# Patient Record
Sex: FEMALE | Race: BLACK OR AFRICAN AMERICAN
[De-identification: names, ages, dates, MRNs, and addresses within clinical notes are randomized per-mention and may not be internally consistent; named-entity substitution may affect disease eponyms.]

---

## 2017-01-11 ENCOUNTER — HOSPITAL ENCOUNTER (EMERGENCY)
Dept: HOSPITAL 61 - ER | Age: 37
LOS: 1 days | Discharge: HOME | End: 2017-01-12
Payer: COMMERCIAL

## 2017-01-11 VITALS — HEIGHT: 65 IN | BODY MASS INDEX: 39.65 KG/M2 | WEIGHT: 238 LBS

## 2017-01-11 DIAGNOSIS — R10.13: ICD-10-CM

## 2017-01-11 DIAGNOSIS — R07.89: Primary | ICD-10-CM

## 2017-01-11 DIAGNOSIS — M54.6: ICD-10-CM

## 2017-01-11 DIAGNOSIS — R42: ICD-10-CM

## 2017-01-11 LAB
ALBUMIN SERPL-MCNC: 3.4 G/DL (ref 3.4–5)
ALP SERPL-CCNC: 120 U/L (ref 46–116)
ALT SERPL-CCNC: 26 U/L (ref 14–59)
ANION GAP SERPL CALC-SCNC: 9 MMOL/L (ref 6–14)
AST SERPL-CCNC: 20 U/L (ref 15–37)
BASOPHILS # BLD AUTO: 0.1 X10^3/UL (ref 0–0.2)
BASOPHILS NFR BLD: 1 % (ref 0–3)
BILIRUB DIRECT SERPL-MCNC: 0.1 MG/DL (ref 0–0.2)
BILIRUB SERPL-MCNC: 0.2 MG/DL (ref 0.2–1)
BUN SERPL-MCNC: 8 MG/DL (ref 7–20)
CALCIUM SERPL-MCNC: 9.1 MG/DL (ref 8.5–10.1)
CHLORIDE SERPL-SCNC: 102 MMOL/L (ref 98–107)
CO2 SERPL-SCNC: 26 MMOL/L (ref 21–32)
CREAT SERPL-MCNC: 0.7 MG/DL (ref 0.6–1)
EOSINOPHIL NFR BLD: 2 % (ref 0–3)
ERYTHROCYTE [DISTWIDTH] IN BLOOD BY AUTOMATED COUNT: 17.4 % (ref 11.5–14.5)
GFR SERPLBLD BASED ON 1.73 SQ M-ARVRAT: 115.2 ML/MIN
GLUCOSE SERPL-MCNC: 123 MG/DL (ref 70–99)
HCT VFR BLD CALC: 33.5 % (ref 36–47)
HGB BLD-MCNC: 10.2 G/DL (ref 12–15.5)
LYMPHOCYTES # BLD: 2.4 X10^3/UL (ref 1–4.8)
LYMPHOCYTES NFR BLD AUTO: 21 % (ref 24–48)
MCH RBC QN AUTO: 23 PG (ref 25–35)
MCHC RBC AUTO-ENTMCNC: 31 G/DL (ref 31–37)
MCV RBC AUTO: 76 FL (ref 79–100)
MONOCYTES NFR BLD: 8 % (ref 0–9)
NEG OBC UR: (no result)
NEUTROPHILS NFR BLD AUTO: 70 % (ref 31–73)
PLATELET # BLD AUTO: 467 X10^3/UL (ref 140–400)
POS OBC UR: (no result)
POTASSIUM SERPL-SCNC: 4.5 MMOL/L (ref 3.5–5.1)
PROT SERPL-MCNC: 8.1 G/DL (ref 6.4–8.2)
RBC # BLD AUTO: 4.41 X10^6/UL (ref 3.5–5.4)
SODIUM SERPL-SCNC: 137 MMOL/L (ref 136–145)
WBC # BLD AUTO: 11.8 X10^3/UL (ref 4–11)

## 2017-01-11 PROCEDURE — 96374 THER/PROPH/DIAG INJ IV PUSH: CPT

## 2017-01-11 PROCEDURE — 84484 ASSAY OF TROPONIN QUANT: CPT

## 2017-01-11 PROCEDURE — 36415 COLL VENOUS BLD VENIPUNCTURE: CPT

## 2017-01-11 PROCEDURE — 99285 EMERGENCY DEPT VISIT HI MDM: CPT

## 2017-01-11 PROCEDURE — 81025 URINE PREGNANCY TEST: CPT

## 2017-01-11 PROCEDURE — 85027 COMPLETE CBC AUTOMATED: CPT

## 2017-01-11 PROCEDURE — 96361 HYDRATE IV INFUSION ADD-ON: CPT

## 2017-01-11 PROCEDURE — 80048 BASIC METABOLIC PNL TOTAL CA: CPT

## 2017-01-11 PROCEDURE — 71020: CPT

## 2017-01-11 PROCEDURE — 93005 ELECTROCARDIOGRAM TRACING: CPT

## 2017-01-11 PROCEDURE — S0028 INJECTION, FAMOTIDINE, 20 MG: HCPCS

## 2017-01-11 PROCEDURE — 80076 HEPATIC FUNCTION PANEL: CPT

## 2017-01-11 PROCEDURE — 83690 ASSAY OF LIPASE: CPT

## 2017-01-11 NOTE — PHYS DOC
Past Medical History


Past Medical History:  No Pertinent History


Past Surgical History:  No Surgical History


Alcohol Use:  None


Drug Use:  None





Adult General


Chief Complaint


Chief Complaint:  CHEST PAIN





HPI


HPI


Patient is a 36  year old female who presents with chest pain. Patient reports 

since earlier this evening she has had sternal pressure as well as back pain 

and epigastric pain. She also reports a little bit dizziness. No shortness of 

breath. No clear inciting or mitigating factors. She has not taken anything for 

symptoms.





Review of Systems


Review of Systems


Constitutional: Denies fever or chills 


Eyes: Denies change in visual acuity or eye pain 


HENT: Denies nasal congestion or sore throat 


Respiratory: Denies cough or shortness of breath 


Cardiovascular: Chest pain


GI: Epigastric abdominal pain. Denies nausea, vomiting, bloody stools or 

diarrhea 


: Denies dysuria or hematuria 


Musculoskeletal: Upper back pain


Integument: Denies rash or skin lesions 


Neurologic: Denies headache, focal weakness or sensory changes





Current Medications


Current Medications





 Current Medications








 Medications


  (Trade)  Dose


 Ordered  Sig/Hesham  Start Time


 Stop Time Status Last Admin


Dose Admin


 


 Acetaminophen/


 Hydrocodone Bitart


  (Lortab 5/325)  2 tab  1X  ONCE  1/11/17 23:15


 1/11/17 23:16 DC 1/11/17 23:15


2 TAB


 


 Famotidine


  (Pepcid)  20 mg  1X  ONCE  1/11/17 23:15


 1/11/17 23:16 DC 1/11/17 23:14


20 MG


 


 Sodium Chloride


  (Iv Sodium


 Chloride 0.9%


 1000ml Bag)  1,000 ml @ 


 1,000 mls/hr  Q1H  1/11/17 23:00


 1/11/17 23:59 DC 1/11/17 23:14


1,000 MLS/HR











Allergies


Allergies





 Allergies








Coded Allergies Type Severity Reaction Last Updated Verified


 


  No Known Drug Allergies    7/8/15 No











Physical Exam


Physical Exam


Constitutional: Well developed, well nourished, no acute distress, non-toxic 

appearance 


HENT: Normocephalic, atraumatic, bilateral external ears normal


Eyes: EOMI, conjunctiva normal, no discharge


Neck: Normal range of motion, no stridor


Cardiovascular: Heart rate normal, regular rhythm, no murmur 


Lungs & Thorax:  Bilateral breath sounds clear to auscultation


Abdomen: Bowel sounds normal, soft, non-distended, epigastric TTP without 

guarding or rebound


Skin: Warm, dry, no erythema, no rash


Extremities: No obvious deformity, no edema


Neurologic: Alert and oriented X 3, no gross deficits noted





Current Patient Data


Vital Signs





 Vital Signs








  Date Time  Temp Pulse Resp B/P Pulse Ox O2 Delivery O2 Flow Rate FiO2


 


1/12/17 00:00  94 22 116/69 98 Room Air  








Lab Values





 Laboratory Tests








Test


  1/11/17


21:50 1/11/17


23:15


 


White Blood Count


  11.8x10^3/uL


(4.0-11.0)  H 


 


 


Red Blood Count


  4.41x10^6/uL


(3.50-5.40) 


 


 


Hemoglobin


  10.2g/dL


(12.0-15.5)  L 


 


 


Hematocrit


  33.5%


(36.0-47.0)  L 


 


 


Mean Corpuscular Volume


  76fL ()


L 


 


 


Mean Corpuscular Hemoglobin 23pg (25-35)  L 


 


Mean Corpuscular Hemoglobin


Concent 31g/dL (31-37)


  


 


 


Red Cell Distribution Width


  17.4%


(11.5-14.5)  H 


 


 


Platelet Count


  467x10^3/uL


(140-400)  H 


 


 


Neutrophils (%) (Auto) 70% (31-73)   


 


Lymphocytes (%) (Auto) 21% (24-48)  L 


 


Monocytes (%) (Auto) 8% (0-9)   


 


Eosinophils (%) (Auto) 2% (0-3)   


 


Basophils (%) (Auto) 1% (0-3)   


 


Neutrophils # (Auto)


  8.2x10^3uL


(1.8-7.7)  H 


 


 


Lymphocytes # (Auto)


  2.4x10^3/uL


(1.0-4.8) 


 


 


Monocytes # (Auto)


  0.9x10^3/uL


(0.0-1.1) 


 


 


Eosinophils # (Auto)


  0.2x10^3/uL


(0.0-0.7) 


 


 


Basophils # (Auto)


  0.1x10^3/uL


(0.0-0.2) 


 


 


Sodium Level


  137mmol/L


(136-145) 


 


 


Potassium Level


  4.5mmol/L


(3.5-5.1) 


 


 


Chloride Level


  102mmol/L


() 


 


 


Carbon Dioxide Level


  26mmol/L


(21-32) 


 


 


Anion Gap 9 (6-14)   


 


Blood Urea Nitrogen 8mg/dL (7-20)   


 


Creatinine


  0.7mg/dL


(0.6-1.0) 


 


 


Estimated GFR


(Cockcroft-Gault) 115.2  


  


 


 


Glucose Level


  123mg/dL


(70-99)  H 


 


 


Calcium Level


  9.1mg/dL


(8.5-10.1) 


 


 


Total Bilirubin


  0.2mg/dL


(0.2-1.0) 


 


 


Direct Bilirubin


  0.1mg/dL


(0.0-0.2) 


 


 


Aspartate Amino Transferase


(AST) 20U/L (15-37)  


  


 


 


Alanine Aminotransferase (ALT) 26U/L (14-59)   


 


Alkaline Phosphatase


  120U/L


()  H 


 


 


Troponin I Quantitative


  < 0.017ng/mL


(0.000-0.055) 


 


 


Total Protein


  8.1g/dL


(6.4-8.2) 


 


 


Albumin


  3.4g/dL


(3.4-5.0) 


 


 


Lipase


  152U/L


() 


 


 


Urine Pregnancy Test


  


  Negative (NEG)


 





 Laboratory Tests


1/11/17 21:50








 Laboratory Tests


1/11/17 21:50











EKG


EKG


EKG (my read): sinus tachycardia, rate 110, normal axis, intervals wnl, no 

acute ischemic changes





Radiology/Procedures


Radiology/Procedures


CXR (my read): 


No acute abnormality





Course & Med Decision Making


Course & Med Decision Making


Pertinent Labs and Imaging studies reviewed. (See chart for details)


Patient is 36-year-old female who presents with chest and upper back pain. 

Suspect GERD as etiology for symptoms. Will screen for more serious pathology 

with EKG, chest x-ray, labs. IV fluids, Pepcid, oral pain medication ordered 

for relief of symptoms. EKG and chest x-ray okay per my read. Labs notable for 

mild leukocytosis and anemia, both near baseline. Troponin within normal 

limits. Discussed results with patient. Discharged home with prescription for 

tramadol and Pepcid, instructions for follow-up, return precautions.





Dragon Disclaimer


Dragon Disclaimer


This electronic medical record was generated, in whole or in part, using a 

voice recognition dictation system.





Departure


Departure


Impression:  


 Primary Impression:  


 Atypical chest pain


Disposition:  01 HOME, SELF-CARE


Condition:  STABLE


Referrals:  


NO PCP (PCP)


Patient Instructions:  Chest Pain (Nonspecific), Gastroesophageal Reflux Disease

, Adult





Additional Instructions:


Thank you for allowing us to provide care today in the Emergency Department.


Take the provided medication as directed. Use caution when taking the pain 

medication as it can make you drowsy.


Schedule a follow up appointment with your primary care doctor. 


Return promptly to the Emergency Department if you develop any new or 

concerning symptoms.


Scripts


Famotidine (Pepcid)20 Mg [id number] Mg PO HS #30 TAB


   Prov:MAVIS LO MD         1/12/17


Tramadol Hcl 50 Mg Tablet1 Tab PO PRN Q6HRS PRN PAIN #15 TAB


   Prov:MAVIS LO MD         1/12/17








MAVIS LO MD Jan 11, 2017 22:59

## 2017-01-12 VITALS — DIASTOLIC BLOOD PRESSURE: 69 MMHG | SYSTOLIC BLOOD PRESSURE: 116 MMHG

## 2017-01-12 NOTE — EKG
[facility]

              [address] [place] [address]

Test Date:    2017               Test Time:    21:47:08

Pat Name:     SHAKILA NEGRETE          Department:   

Patient ID:   [id number]           Room:          

Gender:       F                        Technician:   

:          [id number]               Requested By: MAVIS LO

Order Number: [id number]            Reading MD:   Dominik Saunders

                                 Measurements

Intervals                              Axis          

Rate:         110                      P:            43

CT:           138                      QRS:          33

QRSD:         80                       T:            13

QT:           336                                    

QTc:          460                                    

                           Interpretive Statements

SINUS TACHYCARDIA



Electronically Signed On 2017 10:06:27 CST by Dominik Saunders

## 2017-01-12 NOTE — RAD
EXAM: Chest, 2 views.



HISTORY: Chest pain.



COMPARISON: None.



FINDINGS: Frontal and lateral views of the chest are obtained. There is no

infiltrate, effusion or pneumothorax. The heart is normal in size.



IMPRESSION: No acute pulmonary finding.

## 2018-10-21 ENCOUNTER — HOSPITAL ENCOUNTER (EMERGENCY)
Dept: HOSPITAL 61 - ER | Age: 38
Discharge: HOME | End: 2018-10-21
Payer: COMMERCIAL

## 2018-10-21 VITALS — DIASTOLIC BLOOD PRESSURE: 72 MMHG | SYSTOLIC BLOOD PRESSURE: 120 MMHG

## 2018-10-21 VITALS — WEIGHT: 244 LBS | BODY MASS INDEX: 40.65 KG/M2 | HEIGHT: 65 IN

## 2018-10-21 DIAGNOSIS — R20.2: Primary | ICD-10-CM

## 2018-10-21 LAB
ALBUMIN SERPL-MCNC: 3.3 G/DL (ref 3.4–5)
ALBUMIN/GLOB SERPL: 0.6 {RATIO} (ref 1–1.7)
ALP SERPL-CCNC: 111 U/L (ref 46–116)
ALT SERPL-CCNC: 19 U/L (ref 14–59)
AMPHETAMINE/METHAMPHETAMINE: (no result)
ANION GAP SERPL CALC-SCNC: 11 MMOL/L (ref 6–14)
APTT PPP: YELLOW S
AST SERPL-CCNC: 11 U/L (ref 15–37)
BACTERIA #/AREA URNS HPF: 0 /HPF
BARBITURATES UR-MCNC: (no result) UG/ML
BASOPHILS # BLD AUTO: 0.1 X10^3/UL (ref 0–0.2)
BASOPHILS NFR BLD: 1 % (ref 0–3)
BENZODIAZ UR-MCNC: (no result) UG/L
BILIRUB SERPL-MCNC: 0.4 MG/DL (ref 0.2–1)
BILIRUB UR QL STRIP: NEGATIVE
BUN SERPL-MCNC: 8 MG/DL (ref 7–20)
BUN/CREAT SERPL: 11 (ref 6–20)
CALCIUM SERPL-MCNC: 8.8 MG/DL (ref 8.5–10.1)
CANNABINOIDS UR-MCNC: (no result) UG/L
CHLORIDE SERPL-SCNC: 105 MMOL/L (ref 98–107)
CO2 SERPL-SCNC: 27 MMOL/L (ref 21–32)
COCAINE UR-MCNC: (no result) NG/ML
CREAT SERPL-MCNC: 0.7 MG/DL (ref 0.6–1)
EOSINOPHIL NFR BLD: 0.1 X10^3/UL (ref 0–0.7)
EOSINOPHIL NFR BLD: 1 % (ref 0–3)
ERYTHROCYTE [DISTWIDTH] IN BLOOD BY AUTOMATED COUNT: 16.4 % (ref 11.5–14.5)
FIBRINOGEN PPP-MCNC: CLEAR MG/DL
GFR SERPLBLD BASED ON 1.73 SQ M-ARVRAT: 113.9 ML/MIN
GLOBULIN SER-MCNC: 5.1 G/DL (ref 2.2–3.8)
GLUCOSE SERPL-MCNC: 115 MG/DL (ref 70–99)
HCT VFR BLD CALC: 35.2 % (ref 36–47)
HGB BLD-MCNC: 11.8 G/DL (ref 12–15.5)
LYMPHOCYTES # BLD: 2.6 X10^3/UL (ref 1–4.8)
LYMPHOCYTES NFR BLD AUTO: 26 % (ref 24–48)
MAGNESIUM SERPL-MCNC: 2 MG/DL (ref 1.8–2.4)
MCH RBC QN AUTO: 26 PG (ref 25–35)
MCHC RBC AUTO-ENTMCNC: 33 G/DL (ref 31–37)
MCV RBC AUTO: 76 FL (ref 79–100)
METHADONE SERPL-MCNC: (no result) NG/ML
MONO #: 0.6 X10^3/UL (ref 0–1.1)
MONOCYTES NFR BLD: 6 % (ref 0–9)
NEUT #: 6.6 X10^3UL (ref 1.8–7.7)
NEUTROPHILS NFR BLD AUTO: 66 % (ref 31–73)
NITRITE UR QL STRIP: NEGATIVE
OPIATES UR-MCNC: (no result) NG/ML
PCP SERPL-MCNC: (no result) MG/DL
PH UR STRIP: 5.5 [PH]
PLATELET # BLD AUTO: 425 X10^3/UL (ref 140–400)
POTASSIUM SERPL-SCNC: 3.9 MMOL/L (ref 3.5–5.1)
PREG TEST PT QUAL: NEGATIVE
PROT SERPL-MCNC: 8.4 G/DL (ref 6.4–8.2)
PROT UR STRIP-MCNC: NEGATIVE MG/DL
PROTHROMBIN TIME: 12.7 SEC (ref 11.7–14)
RBC # BLD AUTO: 4.61 X10^6/UL (ref 3.5–5.4)
RBC #/AREA URNS HPF: 0 /HPF (ref 0–2)
SODIUM SERPL-SCNC: 143 MMOL/L (ref 136–145)
SQUAMOUS #/AREA URNS LPF: (no result) /LPF
UROBILINOGEN UR-MCNC: 0.2 MG/DL
WBC # BLD AUTO: 10 X10^3/UL (ref 4–11)
WBC #/AREA URNS HPF: (no result) /HPF (ref 0–4)

## 2018-10-21 PROCEDURE — G0479 DRUG TEST PRESUMP NOT OPT: HCPCS

## 2018-10-21 PROCEDURE — 84443 ASSAY THYROID STIM HORMONE: CPT

## 2018-10-21 PROCEDURE — 80053 COMPREHEN METABOLIC PANEL: CPT

## 2018-10-21 PROCEDURE — 83605 ASSAY OF LACTIC ACID: CPT

## 2018-10-21 PROCEDURE — 84703 CHORIONIC GONADOTROPIN ASSAY: CPT

## 2018-10-21 PROCEDURE — 72148 MRI LUMBAR SPINE W/O DYE: CPT

## 2018-10-21 PROCEDURE — 36415 COLL VENOUS BLD VENIPUNCTURE: CPT

## 2018-10-21 PROCEDURE — 81001 URINALYSIS AUTO W/SCOPE: CPT

## 2018-10-21 PROCEDURE — 83735 ASSAY OF MAGNESIUM: CPT

## 2018-10-21 PROCEDURE — 81025 URINE PREGNANCY TEST: CPT

## 2018-10-21 PROCEDURE — 85610 PROTHROMBIN TIME: CPT

## 2018-10-21 PROCEDURE — 85025 COMPLETE CBC W/AUTO DIFF WBC: CPT

## 2018-10-21 PROCEDURE — 80307 DRUG TEST PRSMV CHEM ANLYZR: CPT

## 2018-10-21 NOTE — RAD
MRI lumbar spine without contrast 10/21/2018

 

CLINICAL INDICATION: Lower extremity numbness into the groin.

 

COMPARISON: None.

 

TECHNIQUE: Multisequence, multi planar MR imaging of the lumbar spine was 

obtained without contrast.

 

FINDINGS:

 

Examination assumes 5 nonrib-bearing lumbar-type vertebral bodies. 

Vertebral body heights are maintained. Mild disc degeneration at L5-S1 

with disc desiccation and disc space narrowing. The conus medullaris is 

normal in appearance and position terminating posterior to L1. The 

paraspinal soft tissues are unremarkable. No suspicious marrow signal 

abnormality.

 

At T12-L1 no significant spinal canal or neural foraminal narrowing.

 

At L1-L2, no significant spinal canal or neural foraminal narrowing.

 

At L2-L3, no significant spinal canal or neural foraminal narrowing.

 

At L3-L4, no significant spinal canal or neural foraminal narrowing.

 

At L4-L5, no significant spinal canal or neural foraminal narrowing.

 

At L5-S1, shallow concentric disc bulging with superimposed central/right 

central disc protrusion without significant spinal canal or neural 

foraminal narrowing.

 

IMPRESSION:

At L5-S1, disc degeneration with disc protrusion without significant 

spinal canal or neural foraminal narrowing.

 

Electronically signed by: Joss Tran MD (10/21/2018 4:49 PM) [facility]

## 2018-10-21 NOTE — PHYS DOC
Past Medical History


Past Medical History:  No Pertinent History


Past Surgical History:  No Surgical History


Alcohol Use:  None


Drug Use:  None





Adult General


Chief Complaint


Chief Complaint:  NEURO SYMPTOMS/DEFICITS





HPI


HPI





Patient is a 38  year old female] who presents with bilateral lower extremity 

numbness. Patient was last normal when she went to bed sometime between 

midnight and 2 AM today. Woke up at approximately noon with bilateral lower 

extremity numbness. This does not affect her walking. No weakness. Reports that 

this is from the waist inferiorly. There is been no trauma, no loss of bowel or 

bladder control. Denies any fever. Denies any prior history of this happening. 

Nothing seems to make this better or worse.





Review of Systems


Review of Systems





Constitutional: Denies fever or chills []


Eyes: Denies change in visual acuity, redness, or eye pain []


HENT: Denies nasal congestion or sore throat []


Respiratory: Denies cough or shortness of breath []


Cardiovascular: No chest pain or palpitations[]


GI: Denies abdominal pain, nausea, vomiting, bloody stools or diarrhea []


: Denies dysuria or hematuria []


Musculoskeletal: Denies back pain or joint pain []


Integument: Denies rash or skin lesions []


Neurologic: Denies headache or focal weakness []


Endocrine: Denies polyuria or polydipsia []





All other systems were reviewed and found to be within normal limits, except as 

documented in this note.





Allergies


Allergies





Allergies








Coded Allergies Type Severity Reaction Last Updated Verified


 


  No Known Drug Allergies    7/8/15 No











Physical Exam


Physical Exam





Constitutional: Well developed, well nourished, no acute distress, non-toxic 

appearance. []


HENT: Normocephalic, atraumatic, bilateral external ears normal, oropharynx 

moist, no oral exudates, nose normal. []


Eyes: PERRLA, EOMI, conjunctiva normal, no discharge. [] 


Neck: Normal range of motion, no tenderness, supple, no stridor. [] 


Cardiovascular:Heart rate regular rhythm, no murmur []


Lungs & Thorax:  Bilateral breath sounds clear to auscultation []


Abdomen: Bowel sounds normal, soft, no tenderness, no masses, no pulsatile 

masses. [] 


Skin: Warm, dry, no erythema, no rash. [] 


Back: No tenderness, no CVA tenderness. [] 


Extremities: No tenderness, no cyanosis, no clubbing, ROM intact, no edema. [] 


Neurologic: Alert and oriented X 3, normal motor function, normal sensory 

function, no focal deficits noted. DTRs are 2 over 4 and bilaterally symmetric 

in the patella and Achilles. Patient has normal proprioception of the lower 

extremities. Normal sharp versus dull. Normal gait.[]


Psychologic: Affect normal, judgement normal, mood normal. []





Current Patient Data


Vital Signs





 Vital Signs








  Date Time  Temp Pulse Resp B/P (MAP) Pulse Ox O2 Delivery O2 Flow Rate FiO2


 


10/21/18 14:25 98.2 100 12 149/90 (109) 98 Room Air  





 98.2       








Lab Values





 Laboratory Tests








Test


 10/21/18


15:20 10/21/18


15:35 10/21/18


16:00


 


Lactic Acid Level


 1.7 mmol/L


(0.4-2.0) 


 





 


White Blood Count


 


 10.0 x10^3/uL


(4.0-11.0) 





 


Red Blood Count


 


 4.61 x10^6/uL


(3.50-5.40) 





 


Hemoglobin


 


 11.8 g/dL


(12.0-15.5)  L 





 


Hematocrit


 


 35.2 %


(36.0-47.0)  L 





 


Mean Corpuscular Volume


 


 76 fL ()


L 





 


Mean Corpuscular Hemoglobin  26 pg (25-35)   


 


Mean Corpuscular Hemoglobin


Concent 


 33 g/dL


(31-37) 





 


Red Cell Distribution Width


 


 16.4 %


(11.5-14.5)  H 





 


Platelet Count


 


 425 x10^3/uL


(140-400)  H 





 


Neutrophils (%) (Auto)  66 % (31-73)   


 


Lymphocytes (%) (Auto)  26 % (24-48)   


 


Monocytes (%) (Auto)  6 % (0-9)   


 


Eosinophils (%) (Auto)  1 % (0-3)   


 


Basophils (%) (Auto)  1 % (0-3)   


 


Neutrophils # (Auto)


 


 6.6 x10^3uL


(1.8-7.7) 





 


Lymphocytes # (Auto)


 


 2.6 x10^3/uL


(1.0-4.8) 





 


Monocytes # (Auto)


 


 0.6 x10^3/uL


(0.0-1.1) 





 


Eosinophils # (Auto)


 


 0.1 x10^3/uL


(0.0-0.7) 





 


Basophils # (Auto)


 


 0.1 x10^3/uL


(0.0-0.2) 





 


Prothrombin Time


 


 12.7 SEC


(11.7-14.0) 





 


Prothrombin Time INR  1.0 (0.8-1.1)   


 


Sodium Level


 


 143 mmol/L


(136-145) 





 


Potassium Level


 


 3.9 mmol/L


(3.5-5.1) 





 


Chloride Level


 


 105 mmol/L


() 





 


Carbon Dioxide Level


 


 27 mmol/L


(21-32) 





 


Anion Gap  11 (6-14)   


 


Blood Urea Nitrogen


 


 8 mg/dL (7-20)


 





 


Creatinine


 


 0.7 mg/dL


(0.6-1.0) 





 


Estimated GFR


(Cockcroft-Gault) 


 113.9  


 





 


BUN/Creatinine Ratio  11 (6-20)   


 


Glucose Level


 


 115 mg/dL


(70-99)  H 





 


Calcium Level


 


 8.8 mg/dL


(8.5-10.1) 





 


Magnesium Level


 


 2.0 mg/dL


(1.8-2.4) 





 


Total Bilirubin


 


 0.4 mg/dL


(0.2-1.0) 





 


Aspartate Amino Transferase


(AST) 


 11 U/L (15-37)


L 





 


Alanine Aminotransferase (ALT)


 


 19 U/L (14-59)


 





 


Alkaline Phosphatase


 


 111 U/L


() 





 


Total Protein


 


 8.4 g/dL


(6.4-8.2)  H 





 


Albumin


 


 3.3 g/dL


(3.4-5.0)  L 





 


Albumin/Globulin Ratio


 


 0.6 (1.0-1.7)


L 





 


Thyroid Stimulating Hormone


(TSH) 


 2.205 uIU/mL


(0.358-3.74) 





 


Serum Pregnancy Test,


Qualitative 


 Negative (NEG)


 





 


Urine Collection Type   Unknown  


 


Urine Color   Yellow  


 


Urine Clarity   Clear  


 


Urine pH   5.5  


 


Urine Specific Gravity   1.025  


 


Urine Protein


 


 


 Negative mg/dL


(NEG-TRACE)


 


Urine Glucose (UA)


 


 


 Negative mg/dL


(NEG)


 


Urine Ketones (Stick)


 


 


 Negative mg/dL


(NEG)


 


Urine Blood


 


 


 Negative (NEG)





 


Urine Nitrite


 


 


 Negative (NEG)





 


Urine Bilirubin


 


 


 Negative (NEG)





 


Urine Urobilinogen Dipstick


 


 


 0.2 mg/dL (0.2


mg/dL)


 


Urine Leukocyte Esterase


 


 


 Negative (NEG)





 


Urine RBC   0 /HPF (0-2)  


 


Urine WBC


 


 


 Occ /HPF (0-4)





 


Urine Squamous Epithelial


Cells 


 


 Many /LPF  





 


Urine Bacteria


 


 


 0 /HPF (0-FEW)





 


Urine Mucus   Marked /LPF  


 


Urine Opiates Screen   Neg (NEG)  


 


Urine Methadone Screen   Neg (NEG)  


 


Urine Barbiturates   Neg (NEG)  


 


Urine Phencyclidine Screen   Neg (NEG)  


 


Urine


Amphetamine/Methamphetamine 


 


 Neg (NEG)  





 


Urine Benzodiazepines Screen   Neg (NEG)  


 


Urine Cocaine Screen   Neg (NEG)  


 


Urine Cannabinoids Screen   Neg (NEG)  


 


Urine Ethyl Alcohol   Neg (NEG)  





 Laboratory Tests


10/21/18 15:35








 Laboratory Tests


10/21/18 15:35














EKG


EKG


[]





Radiology/Procedures


Radiology/Procedures


MRI of the lumbar spine was obtained that shows L5-S1 disc degeneration with 

discomfort trusion without significant spinal canal or neural foraminal 

narrowing[]





Course & Med Decision Making


Course & Med Decision Making


Pertinent Labs and Imaging studies reviewed. (See chart for details)





Medical decision making: There is no evidence of stroke syndrome, no evidence 

of cauda equina syndrome, no evidence of spinal epidural abscess, no evidence 

of significant neurologic impairment. No evidence of significant electrolyte 

abnormality is a trigger for this subjective numbness.[]





Dragon Disclaimer


Dragon Disclaimer


This electronic medical record was generated, in whole or in part, using a 

voice recognition dictation system.





Departure


Departure


Impression:  


 Primary Impression:  


 Paresthesia


Disposition:  01 HOME, SELF-CARE


Condition:  GOOD


Referrals:  


NO PCP (PCP)


Patient Instructions:  Paresthesia





Additional Instructions:  


Follow-up with your regular doctor in 2 days. Return to the ER if any 

increasing numbness, weakness, or any other concerns.











NARCISA ALBRECHT DO Oct 21, 2018 17:08